# Patient Record
Sex: MALE | Race: WHITE
[De-identification: names, ages, dates, MRNs, and addresses within clinical notes are randomized per-mention and may not be internally consistent; named-entity substitution may affect disease eponyms.]

---

## 2017-03-06 ENCOUNTER — HOSPITAL ENCOUNTER (OUTPATIENT)
Dept: HOSPITAL 47 - RADMRIMAIN | Age: 27
Discharge: HOME | End: 2017-03-06
Payer: COMMERCIAL

## 2017-03-06 DIAGNOSIS — S83.232A: Primary | ICD-10-CM

## 2017-03-06 NOTE — MR
EXAMINATION TYPE: MR knee LT wo con

 

DATE OF EXAM: 3/6/2017 7:56 PM

 

COMPARISON: NONE

 

HISTORY: Patient having left knee pain, onset few years ago

 

TECHNIQUE: Multiplanar, multisequence imaging of the left knee is performed without IV contrast.

 

FINDINGS:

 

MEDIAL MENISCUS: Anterior and posterior horns are intact without tear.

 

LATERAL MENISCUS: Oblique tear posterior horn lateral meniscus as well as radial tear at the junction
 of the body and anterior horn. Air is evidence of a meniscal cyst measuring 1.7 x 1.0 cm.

 

CRUCIATE LIGAMENTS: The anterior and posterior cruciate ligaments are intact and unremarkable. 5 mm c
yst near the insertion of the ACL is felt to reflect a small ganglion cyst.

 

COLLATERAL LIGAMENTS: The medial collateral ligament and lateral collateral ligament complex are inta
ct and unremarkable.

 

EXTENSOR MECHANISM: Visualized quadriceps and patellar tendons are intact.

 

EFFUSION:  No significant suprapatellar joint effusion.

 

POPLITEAL CYST:  No popliteal/baker cyst.

 

TRICOMPARTMENT SPACES: Intact

 

CARTILAGE: Intact

 

BONE MARROW SIGNAL: No focal abnormal marrow signal is appreciated.

 

OTHER:  No additional significant abnormality is appreciated.

 

IMPRESSION: 

1 complex lateral meniscal tear as discussed with associated meniscal cyst.

## 2017-04-14 ENCOUNTER — HOSPITAL ENCOUNTER (OUTPATIENT)
Dept: HOSPITAL 47 - RADUSWWP | Age: 27
Discharge: HOME | End: 2017-04-14
Payer: COMMERCIAL

## 2017-04-14 DIAGNOSIS — R74.8: Primary | ICD-10-CM

## 2017-04-14 PROCEDURE — 76700 US EXAM ABDOM COMPLETE: CPT

## 2017-04-14 NOTE — US
EXAMINATION TYPE: US abdomen complete

 

DATE OF EXAM: 4/14/2017 7:22 AM

 

COMPARISON: NONE

 

CLINICAL HISTORY: Elevated Liver Enzymes R74.8.

 

EXAM MEASUREMENTS:

 

Liver Length:  12.4 cm   

Gallbladder Wall:  0.3 cm   

CBD:  0.3 cm

Spleen:  13.0 cm   

Right Kidney:  10.0 x 4.2 x 5.3 cm 

Left Kidney:  11.9 x 4.1 x 5.3 cm   

 

Pancreas:  visualized portions wnl

Liver:  wnl  

Gallbladder:  No stones seen

**Evidence for sonographic Navarro's sign:  No

CBD:  wnl 

Spleen:  wnl   

Right Kidney:  No hydronephrosis or masses seen   

Left Kidney:  No hydronephrosis or masses seen   

Upper IVC:  wnl  

Abd Aorta:  wnl

 

There is questionable sludge or a small polyp within the gallbladder. Gallbladder wall is upper limit
s of normal 3 mm. No stones are seen.

 

The spleen is upper limits of normal in size 13 cm. The liver is normal in size.

 

IMPRESSION: 

1. SLUDGE VERSUS POLYP WITHIN THE GALLBLADDER.

2. BORDERLINE SPLENOMEGALY.

## 2018-03-16 ENCOUNTER — HOSPITAL ENCOUNTER (OUTPATIENT)
Dept: HOSPITAL 47 - RADMRIMAIN | Age: 28
Discharge: HOME | End: 2018-03-16
Payer: COMMERCIAL

## 2018-03-16 DIAGNOSIS — M51.17: Primary | ICD-10-CM

## 2018-03-16 PROCEDURE — 72148 MRI LUMBAR SPINE W/O DYE: CPT

## 2018-03-16 NOTE — MR
EXAMINATION TYPE: MR lumbar spine wo con

 

DATE OF EXAM: 3/16/2018

 

COMPARISON: NONE

 

HISTORY: Low back

 

TECHNIQUE: T1 and T2  axial and sagittal images of the lumbar spine are submitted.  

 

FINDINGS: There is no abnormal signal seen within the visualized spinal cord or paraspinal soft tissu
es.

 

At L1-2 there is no degenerative disc disease, disc herniation or canal stenosis. No foraminal encroa
chment.

 

At L2-3 there is no degenerative disc disease, disc herniation or canal stenosis. No foraminal encroa
chment

 

At L3-4 there is no degenerative disc disease, disc herniation or canal stenosis. No foraminal encroa
chment

 

At L4-5 there is no degenerative disc disease, disc herniation or canal stenosis. No foraminal encroa
chment

 

At L5-S1 there is disc desiccation. Mild narrowing of the disc space. Neural foramina remain patent. 
However, there is a central disc herniation resulting in effacement of thecal sac.

 

 

IMPRESSION:

1. At L5-S1 there is a central disc herniation resulting in mild effacement of thecal sac. No foramin
al encroachment.

## 2025-03-08 ENCOUNTER — HOSPITAL ENCOUNTER (OUTPATIENT)
Dept: HOSPITAL 47 - LABWHC1 | Age: 35
Discharge: HOME | End: 2025-03-08
Attending: CHIROPRACTOR
Payer: COMMERCIAL

## 2025-03-08 DIAGNOSIS — Z00.00: Primary | ICD-10-CM

## 2025-03-08 PROCEDURE — 82728 ASSAY OF FERRITIN: CPT

## 2025-03-08 PROCEDURE — 36415 COLL VENOUS BLD VENIPUNCTURE: CPT

## 2025-03-08 PROCEDURE — 83540 ASSAY OF IRON: CPT

## 2025-03-08 PROCEDURE — 80061 LIPID PANEL: CPT

## 2025-03-08 PROCEDURE — 80053 COMPREHEN METABOLIC PANEL: CPT

## 2025-03-08 PROCEDURE — 82607 VITAMIN B-12: CPT

## 2025-03-08 PROCEDURE — 84403 ASSAY OF TOTAL TESTOSTERONE: CPT

## 2025-03-08 PROCEDURE — 82652 VIT D 1 25-DIHYDROXY: CPT

## 2025-03-08 PROCEDURE — 84402 ASSAY OF FREE TESTOSTERONE: CPT

## 2025-03-09 LAB
ALBUMIN SERPL-MCNC: 4.3 G/DL (ref 3.8–4.9)
ALBUMIN/GLOB SERPL: 2.15 RATIO (ref 1.6–3.17)
ALP SERPL-CCNC: 120 U/L (ref 41–126)
ALT SERPL-CCNC: 24 U/L (ref 10–49)
ANION GAP SERPL CALC-SCNC: 9.5 MMOL/L (ref 4–12)
AST SERPL-CCNC: 31 U/L (ref 14–35)
BUN SERPL-SCNC: 12.3 MG/DL (ref 9–27)
BUN/CREAT SERPL: 7.69 RATIO (ref 12–20)
CALCIUM SPEC-MCNC: 9.4 MG/DL (ref 8.7–10.3)
CHLORIDE SERPL-SCNC: 104 MMOL/L (ref 96–109)
CHOLEST SERPL-MCNC: 168 MG/DL (ref 0–200)
CO2 SERPL-SCNC: 28.5 MMOL/L (ref 21.6–31.8)
FERRITIN SERPL-MCNC: 163 NG/ML (ref 22–322)
GLOBULIN SER CALC-MCNC: 2 G/DL (ref 1.6–3.3)
GLUCOSE SERPL-MCNC: 93 MG/DL (ref 70–110)
HDLC SERPL-MCNC: 26.4 MG/DL (ref 40–60)
IRON SERPL-MCNC: 95 UG/DL (ref 65–175)
LDLC SERPL CALC-MCNC: 121 MG/DL (ref 0–131)
POTASSIUM SERPL-SCNC: 4.4 MMOL/L (ref 3.5–5.5)
PROT SERPL-MCNC: 6.3 G/DL (ref 6.2–8.2)
SODIUM SERPL-SCNC: 142 MMOL/L (ref 135–145)
TRIGL SERPL-MCNC: 103 MG/DL (ref 0–149)
VIT B12 SERPL-MCNC: 520 PG/ML (ref 200–944)
VLDLC SERPL CALC-MCNC: 20.6 MG/DL (ref 5–40)